# Patient Record
(demographics unavailable — no encounter records)

---

## 2025-01-16 NOTE — REVIEW OF SYSTEMS
[Dizziness] : dizziness [Palpitations] : palpitations [Headache] : no headache [SOB] : no shortness of breath [Chest Discomfort] : no chest discomfort [Lower Ext Edema] : no extremity edema [Cough] : no cough [Abdominal Pain] : no abdominal pain [Joint Pain] : no joint pain [Rash] : no rash [Confusion] : no confusion was observed [Easy Bruising] : no tendency for easy bruising

## 2025-01-16 NOTE — ASSESSMENT
[FreeTextEntry1] : Assessment: Antonella Wilder is a 21 year old woman with past medical history of ADHD and recently diagnosed Postural orthostatic tachycardia syndrome (POTS) presents for follow up visit regarding palpitations and presyncope.  Since her last visit the patient reports feeling improved. She has noticed episodes of presyncope when taking a hot shower and so she sits on the floor in the shower in order for the symptoms to go away. She has infrequent palpitations. ECG consistent with normal sinus rhythm and RSR' pattern. Echocardiogram (9/2024) consistent with normal LV and RV systolic function, no valvular disease. Cardiac event monitor (8/2024) consistent with normal sinus rhythm, ventricular trigeminy was present. BP stable. Labs (8/2024) consistent with  mg/dl, normal CMP, Hgb and TSH.  Recommendations: [] POTS: Symptoms improved with nonpharmacologic therapy; continue to increase water intake to >3 Liters daily, continue adequate sodium intake, consider sport beverages as well, recommend aerobic exercise. No indication for beta blocker at this time and patient's BP will not tolerate it. In addition, recommended patient to avoid very hot showers as this can cause vasovagal syncope - she agrees with plan.  [] Return to office: May 2025

## 2025-01-16 NOTE — HISTORY OF PRESENT ILLNESS
[FreeTextEntry1] : Antonella Wilder is a 21 year old woman with past medical history of ADHD and recently diagnosed Postural orthostatic tachycardia syndrome (POTS) presents for follow up visit regarding palpitations and presyncope.  Since her last visit the patient reports feeling improved. She has noticed episodes of presyncope when taking a hot shower and so she sits on the floor in the shower in order for the symptoms to go away. She reports less dizzy episodes, denies syncope. Reports infrequent palpitations. She has increased her water intake. She denies chest pain or shortness of breath.

## 2025-01-16 NOTE — CARDIOLOGY SUMMARY
[de-identified] : ECG (8/16/24): normal sinus rhythm, RSR' pattern ECG (9/19/24): normal sinus rhythm ECG (1/16/25): normal sinus rhythm, RSR' pattern [de-identified] : Cardiac Event Monitor (8/2024): Normal sinus rhythm. Ventricular trigeminy. [de-identified] : TTE (9/2024): LVEF 55-60%. Normal RV size and function. No aortic valve stenosis. No pericardial effusion.

## 2025-01-16 NOTE — PHYSICAL EXAM
[Normal Conjunctiva] : normal conjunctiva [Normal Gait] : normal gait [Normal] : alert and oriented, normal memory [de-identified] : well appearing [de-identified] : supple [de-identified] : JVP ~ 7 cm H20, RRR, s1, s2, no murmurs [de-identified] : unlabored respirations, clear lung fields [de-identified] : non-distended

## 2025-05-15 NOTE — HISTORY OF PRESENT ILLNESS
[FreeTextEntry1] : Antonella Wilder is a 21 year old woman with past medical history of ADHD and recently diagnosed Postural orthostatic tachycardia syndrome (POTS) presents for follow up visit.  Since her last visit the patient reports feeling improved. She has not had episodes of presyncope or syncope. She denies palpitations, chest pain or shortness of breath.

## 2025-05-15 NOTE — ASSESSMENT
[FreeTextEntry1] : Assessment: Antonella Wilder is a 21 year old woman with past medical history of ADHD and recently diagnosed Postural orthostatic tachycardia syndrome (POTS) presents for follow up visit.  Since her last visit the patient reports feeling improved. She has not had episodes of presyncope or syncope. She denies palpitations, angina or dyspnea. ECG consistent with normal sinus rhythm and RSR' pattern. BP normotensive. Echocardiogram (9/2024) consistent with normal LV and RV systolic function, no valvular disease. Cardiac event monitor (8/2024) consistent with normal sinus rhythm, ventricular trigeminy was present. BP stable. Labs (8/2024) consistent with  mg/dl, normal CMP, Hgb and TSH  Recommendations: [] POTS: Symptoms improved with nonpharmacologic therapy; continue to increase water intake to >3 Liters daily, continue adequate sodium intake, consider sport beverages as well, recommend aerobic exercise. No indication for beta blocker at this time and patient's BP will not tolerate it. In addition, recommended patient to avoid very hot showers as this can cause vasovagal syncope - she agrees with plan.  [] Return to office: 1 year or sooner if new symptoms arise

## 2025-05-15 NOTE — CARDIOLOGY SUMMARY
[de-identified] : ECG (8/16/24): normal sinus rhythm, RSR' pattern ECG (9/19/24): normal sinus rhythm ECG (5/15/25): normal sinus rhythm, RSR' pattern [de-identified] : Cardiac Event Monitor (8/2024): Normal sinus rhythm. Ventricular trigeminy. [de-identified] : TTE (9/2024): LVEF 55-60%. Normal RV size and function. No aortic valve stenosis. No pericardial effusion.

## 2025-05-15 NOTE — PHYSICAL EXAM
[Normal Conjunctiva] : normal conjunctiva [Normal Gait] : normal gait [Normal] : alert and oriented, normal memory [de-identified] : well appearing [de-identified] : supple [de-identified] : JVP ~ 7 cm H20, RRR, s1, s2, no murmurs [de-identified] : unlabored respirations, clear lung fields [de-identified] : non-distended

## 2025-06-12 NOTE — PLAN
[FreeTextEntry1] : - PHQ-9 positive for depression - Pt denies any suicidal or homicidal ideation or plan. Clinically she is stable for outpatient management of depression - Go to ER if develops suicidal thoughts - Most recent ECG reviewed, normal QTc - Labs from August reviewed, recommend repeating TSH and CMP - Will start Zoloft 50mg PO QD, side effects discussed - F/u 6 weeks to reassess - Pt advised not to stop this medication abruptly

## 2025-06-12 NOTE — HISTORY OF PRESENT ILLNESS
[FreeTextEntry8] : Pt is a 21F with PMH POTS, carpal tunnel who was in her usual state of health until about 6 years ago when she started experiencing depression. Pt states that over the past 2 months she has had increased depression symptoms which she attributes to going through a life transition. She recently graduated school with a degree in [employer] but is struggling to find a job in her field. 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 She has started seeing a therapist, but is also interested in discussing pharmacotherapy. She also occasionally experiences social anxiety. No fevers, chills, or any other symptoms today. She has never seen psychiatry and has never taken medication for depression. She reports she gets regular periods and is not planning pregnancy in the near future.

## 2025-06-12 NOTE — PHYSICAL EXAM
[TextEntry] : Constitutional:  No acute distress, well nourished, well developed, and well-appearing Head:  Atraumatic and normocephalic Eyes:  Normal sclera/conjunctiva ENT:  Outer ears and nose were normal in appearance Pulmonary:  No respiratory distress, no accessory muscle use, lungs were clear to auscultation bilaterally, no rales, rhonchi, crackles, or wheezing Cardiac:  Normal rate, regular rhythm, normal S1 and S2 and no murmurs, rubs, or gallops heard Vascular:  No peripheral edema Musculoskeletal:  No joint swelling and grossly normal strength/tone.  No edema in bilateral arms or legs, pt moving all 4 extremities spontaneously Skin:  No rash Neurology:  Coordination grossly intact and normal gait, facial expressions are symmetric Psychiatric:  The affect was normal and insight and judgment were intact

## 2025-07-29 NOTE — HEALTH RISK ASSESSMENT
[Nearly Every Day (3)] : 4.) Feeling tired or having little energy? Nearly every day [1/2 of Days or More (2)] : 7.) Trouble concentrating on things, such as reading a newspaper or watching television? Half the days or more [Several Days (1)] : 8.) Moving or speaking so slowly that other people could have noticed, or the opposite, moving or speaking faster than usual? Several days [Not at All (0)] : 9.) Thoughts that you would be off dead or of hurting yourself in some way? Not at all [Moderately Severe] : Severity of Depression is Moderately Severe [Somewhat Difficult] : How difficult have these problems made it for you to do your work, take care of things at home, or get along with people? Somewhat difficult [PHQ-9 Positive] : PHQ-9 Positive [[id number]] : 15

## 2025-07-29 NOTE — PLAN
[FreeTextEntry1] : - Depression still uncontrolled. Discussed options with pt including continuing on the 50mg dose and monitoring to see if nausea subsides, going up to 75mg and seeing if we can get better control of her depression symptoms, or titrating off and trying a different medication to see if we can get better response without nausea - Pt would like to go up to 75 to see if she can get better control of her depression on this medication as she feels her nausea may be unrelated as it started after cleaning a fish tank where there was a lot of dry dust - Increase Zoloft to 75mg PO QD, f/u 3 months - Pt also requests note for [place] stating that she has POTS and should stay well hydrated, and that her symptoms are exacerbated by standing for prolonged periods of time especially in the heat, which we will provide as I can verify from cardiology's note that she does in fact have POTS

## 2025-07-29 NOTE — PHYSICAL EXAM
[TextEntry] : Constitutional:  No acute distress, well nourished, well developed, and well-appearing Head:  Atraumatic and normocephalic Eyes:  Normal sclera/conjunctiva ENT:  Outer ears and nose were normal in appearance Pulmonary:  No respiratory distress, no accessory muscle use, lungs were clear to auscultation bilaterally, no rales, rhonchi, crackles, or wheezing Cardiac:  Normal rate, regular rhythm, normal S1 and S2 and no murmurs, rubs, or gallops heard Vascular:  No peripheral edema Musculoskeletal:  No joint swelling and grossly normal strength/tone. Pt moving all 4 extremities spontaneously Skin:  No rash Neurology:  Coordination grossly intact and normal gait, facial expressions are symmetric Psychiatric:  The affect was normal and insight and judgment were intact

## 2025-07-29 NOTE — HISTORY OF PRESENT ILLNESS
[de-identified] : Pt is a 21F with PMH POTS, carpal tunnel, depression who presents for a follow up of depression. Pt was started on Zoloft 50mg PO QD on 6/12/25. She has had some mild nausea, and some relief of her depression symptoms, but is still experiencing some depression symptoms.